# Patient Record
(demographics unavailable — no encounter records)

---

## 2025-05-05 NOTE — ASSESSMENT
[FreeTextEntry1] : # Dermatitis- chronic; exacerbation  - new dx of uncertain prognosis - morphology on upper back and arms appears more eczematous. Rash on the lower back appears more c/w MF vs TV vs pso vs other. Discussed option to treat both empirically with TAC or also obtain bx of the lower back plaques for CPC, pt opted to proceed with bx.  - Start triamcinolone 0.1% ointment BID to affected areas for up to 2 weeks on/1 week OFF cycles. Repeat cycles as needed. SED.  - long term steroid use side effects such as skin atrophy, hypopigmentation and telangiectasis discussed - patient agrees to use topical steroids sparingly and as prescribed  # Neoplasm of uncertain behavior of the skin Location: R lower back DDx: r/o  MF vs TV vs pso vs other.   Shave biopsy performed today over above location, risks and benefits discussed including incomplete removal, not enough tissue for diagnosis scarring and infection, informed consent obtained, pictures taken, cleaned with alcohol and anesthetized with 1%lido+epi, 1 cc total, lesion was biopsied with a Dermablade, hemostasis obtained with drysol, vaseline and bandaid placed, tolerated well, wound care reviewed. I personally confirmed that the specimen was placed in the labeled vial, and I verbally confirmed this with patient in the room. The vial was closed and specimen sent to pathology.    # Keloid, L ear helix - orientation provided about nature of condition, treatment expectations, alternatives, risks and benefits - Intralesional injection of Kenalog, 20 mg/ml A total of 0.15 ml was injected to a total of # 1 lesion(s). The risks/benefits/alternatives of intralesional steroid injections were reviewed with the patient which include but are not limited to pain, atrophy, and dyspigmentation. Intralesional injections were performed in the affected area. The patient tolerated the procedure well.  RTC 6 weeks, sooner PRN

## 2025-05-05 NOTE — HISTORY OF PRESENT ILLNESS
[FreeTextEntry1] : NP- keloid, rashes [de-identified] : Ms. BATOOL HUDDLESTON  is a 37 year  y/o F  here for evaluation of below   # keloid on L ear helix after piercing last year. sometimes tender/sore.  # eczema on arms. prev improved with OTC HC and moisturizer use.  # Also with rashes on her lower back that come and go. Asx.   Personal hx of skin cancer: no FHx of skin cancer: no Social Hx: hemeon fellow physician

## 2025-05-05 NOTE — PHYSICAL EXAM
[Alert] : alert [Oriented x 3] : ~L oriented x 3 [Well Nourished] : well nourished [Conjunctiva Non-injected] : conjunctiva non-injected [No Visual Lymphadenopathy] : no visual  lymphadenopathy [No Clubbing] : no clubbing [No Edema] : no edema [No Bromhidrosis] : no bromhidrosis [No Chromhidrosis] : no chromhidrosis [FreeTextEntry3] : AAOx3, NAD, well-appearing / pleasant Focused body exam only (see below) per patient request: keloid on L ear helix eczematous plaques and papules on upper arms and upper back thin pink slightly scaly plaques on R>L lower back

## 2025-05-05 NOTE — HISTORY OF PRESENT ILLNESS
[FreeTextEntry1] : NP- keloid, rashes [de-identified] : Ms. BATOOL HUDDLESTON  is a 37 year  y/o F  here for evaluation of below   # keloid on L ear helix after piercing last year. sometimes tender/sore.  # eczema on arms. prev improved with OTC HC and moisturizer use.  # Also with rashes on her lower back that come and go. Asx.   Personal hx of skin cancer: no FHx of skin cancer: no Social Hx: hemeon fellow physician

## 2025-05-15 NOTE — PAST MEDICAL HISTORY
[Menstruating] : menstruating [Regular Cycle Intervals] : have been regular [FreeTextEntry1] : The past 2 months has been a little longer cycle. LMP 4/7.

## 2025-05-15 NOTE — HEALTH RISK ASSESSMENT
[Patient reported PAP Smear was normal] : Patient reported PAP Smear was normal [Never] : Never [Patient reported bone density results were normal] : Patient reported bone density results were normal [PapSmearDate] : 04/25 [BoneDensityDate] : 03/25 [HIVDate] : 04/25 [HepatitisCDate] : 04/25

## 2025-05-15 NOTE — DATA REVIEWED
[FreeTextEntry1] : Patient:     BATOOL HUDDLESTON Accession:                             [id number] Collected Date/Time:                   4/28/2025 13:06 EDT Received Date/Time:                    4/29/2025 15:17 EDT Gynecologic Report - Auth (Verified) Statement of Adequacy Satisfactory for evaluation. Absence of Endocervical/Transformation zone component. Final Diagnosis NEGATIVE FOR INTRAEPITHELIAL LESION OR MALIGNANCY. Screened by: Hill Hopkins CT(ASCP) Verified by: Aimee Lopez CT(ASCP) (Electronic Signature) Reported on: 05/04/25 11:40 EDT, [facility] [facility], [place] [address] Phone: [phone]   Fax: [phone] Cytology processed at [facility], [address] [place] [id number] ThinPreps are processed and reviewed using the [facility] Thin[facility] System. _________________________________________________________________ Specimen(s) Submitted THINPREP WITH IMAGING - Cerv/Endo Clinical Information LMP: 4/15/2025 ICD 10:  Encounter for screening for malignant neoplasm of cervix Disclaimer The Papanicolaou ("Pap") test is a screening test which is subject to both false positive and false negative results. Testing is most reliable when adequate samples are obtained on a regular basis. Results must be interpreted in the context of clinical history and physical examination..

## 2025-05-15 NOTE — ASSESSMENT
[FreeTextEntry1] : 38 y/o F with DM, obesity, high cholesterol here for CPE  DM - last A1c 5.6. Has had significant weight loss on GLP-1 - Continue Mounjaro, metformin - Repeat urine alb/cr - Needs to see Ophtho  High cholesterol - not on statin - Repeat lipid profile  HCM: Received pneumococcal vaccine Pap smear 2025 - negative HPV#1 given today Will check with [facility] about Tdap  RTC in 3-6 months or sooner prn. [Vaccines Reviewed] : Immunizations reviewed today. Please see immunization details in the vaccine log within the immunization flowsheet.

## 2025-05-15 NOTE — PHYSICAL EXAM
[No Acute Distress] : no acute distress [Well Nourished] : well nourished [Well Developed] : well developed [Well-Appearing] : well-appearing [Normal Sclera/Conjunctiva] : normal sclera/conjunctiva [PERRL] : pupils equal round and reactive to light [EOMI] : extraocular movements intact [Normal Outer Ear/Nose] : the outer ears and nose were normal in appearance [Normal Oropharynx] : the oropharynx was normal [No JVD] : no jugular venous distention [No Lymphadenopathy] : no lymphadenopathy [Supple] : supple [Thyroid Normal, No Nodules] : the thyroid was normal and there were no nodules present [No Respiratory Distress] : no respiratory distress  [No Accessory Muscle Use] : no accessory muscle use [Clear to Auscultation] : lungs were clear to auscultation bilaterally [Normal Rate] : normal rate  [Regular Rhythm] : with a regular rhythm [Normal S1, S2] : normal S1 and S2 [No Murmur] : no murmur heard [No Carotid Bruits] : no carotid bruits [No Abdominal Bruit] : a ~M bruit was not heard ~T in the abdomen [No Varicosities] : no varicosities [Pedal Pulses Present] : the pedal pulses are present [No Edema] : there was no peripheral edema [No Palpable Aorta] : no palpable aorta [No Extremity Clubbing/Cyanosis] : no extremity clubbing/cyanosis [Soft] : abdomen soft [Non Tender] : non-tender [Non-distended] : non-distended [No Masses] : no abdominal mass palpated [No HSM] : no HSM [Normal Bowel Sounds] : normal bowel sounds [Normal Posterior Cervical Nodes] : no posterior cervical lymphadenopathy [Normal Anterior Cervical Nodes] : no anterior cervical lymphadenopathy [No CVA Tenderness] : no CVA  tenderness [No Spinal Tenderness] : no spinal tenderness [No Joint Swelling] : no joint swelling [Grossly Normal Strength/Tone] : grossly normal strength/tone [Coordination Grossly Intact] : coordination grossly intact [No Focal Deficits] : no focal deficits [Normal Gait] : normal gait [Deep Tendon Reflexes (DTR)] : deep tendon reflexes were 2+ and symmetric [Normal Affect] : the affect was normal [Normal Insight/Judgement] : insight and judgment were intact [de-identified] : Erythematous papules on bilateral shoulders and between breasts. Erythematous macules on R flank s/p biopsy

## 2025-05-15 NOTE — HISTORY OF PRESENT ILLNESS
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 Will be in adult Oncology seeing YA with GI and sarcoma cancers. 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  Last summer 2024 appetite was no longer suppressed and gained back 10-15 lbs. Started personal training - wasn't effective initially but since Jan 2025 really has tried to change diet and calories/macros. Has lost about 10 lbs since Jan 2025. On Mounjaro 15mg. Has Endo f/u in Sept 2025 (Dr. Hernadez) Has DEXCOM but hasn't been using it.  Planning on doing egg freezing; met with Dr. Mccabe.  Saw Derm last week for keloid on L ear after piercing; did injection.  Has been getting bad eczema on arm; given triamcinolone Also with rash comes/goes on the side. Biopsy done pathology pending.   BMs normal. Wants to see GI for colonoscopy. Sometimes feels incomplete emptying. Previously had blood in the stool but has not been back. Had recent CBC which was stable.   Had Pap smear a few weeks ago.  No smomking. No EtOH. Not sexually active  Poor sleep hygiene.   Mood has been good. Feels like exercise has helped a lot.  Exercise-induced asthma has resolved.  Has some plantar fasciitis in L foot.

## 2025-05-29 NOTE — HISTORY OF PRESENT ILLNESS
[de-identified] : followup   biopsy of patch on lower back suggestive of MF  Social Hx: gerard fellow physician

## 2025-05-29 NOTE — ASSESSMENT
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 Treatment approach is based on type, extent of skin lesions, symptom extent, and presence of extracutaneous disease, and includes both skin-directed (topical steroids, phototherapy, topical nitrogen mustard, topical retinoids) and systemic therapy options - Discussed that MF is not a curable condition, but is a chronic skin condition that is managed with above treatment options that provide disease control, improve quality of life, and minimize infectious complications which can be a source of morbidity. - Discussed overall indolent course in patch stage disease as patient has which can be managed with topicals and skin-directed therapy including NB-UVB. - Reviewed baseline bloodwork - CBC, CMP, ordered flow cytometry which is pending  continue topical steroids - 2 weeks, take 1 week break before resuming

## 2025-05-29 NOTE — PHYSICAL EXAM
[Alert] : alert [Oriented x 3] : ~L oriented x 3 [Well Nourished] : well nourished [Conjunctiva Non-injected] : conjunctiva non-injected [No Visual Lymphadenopathy] : no visual  lymphadenopathy [No Clubbing] : no clubbing [No Edema] : no edema [No Bromhidrosis] : no bromhidrosis [No Chromhidrosis] : no chromhidrosis [FreeTextEntry3] : eczematous plaques and papules on upper arms and upper back thin pink slightly scaly plaques on R>L lower back